# Patient Record
Sex: OTHER/UNKNOWN | URBAN - METROPOLITAN AREA
[De-identification: names, ages, dates, MRNs, and addresses within clinical notes are randomized per-mention and may not be internally consistent; named-entity substitution may affect disease eponyms.]

---

## 2021-09-02 ENCOUNTER — TELEPHONE (OUTPATIENT)
Dept: PLASTIC SURGERY | Facility: CLINIC | Age: 31
End: 2021-09-02

## 2021-09-02 NOTE — TELEPHONE ENCOUNTER
[facility] Call Center    Phone Message    May a detailed message be left on voicemail: yes     Reason for Call: Other: Patient submitted online request to be seen in the Comprehensive [facility]. Hoping to get in Any day and time within 30-45 day.       Action Taken: Message routed to:  [facility] ([facility]): Gender Care    Travel Screening: Not Applicable

## 2021-09-08 NOTE — TELEPHONE ENCOUNTER
Writer called pt regarding scheduling, no answer, unable to leave voicemail due to full mailbox.     Negrita Davison